# Patient Record
Sex: FEMALE | Race: WHITE
[De-identification: names, ages, dates, MRNs, and addresses within clinical notes are randomized per-mention and may not be internally consistent; named-entity substitution may affect disease eponyms.]

---

## 2020-04-25 ENCOUNTER — HOSPITAL ENCOUNTER (INPATIENT)
Dept: HOSPITAL 97 - 2ND-WC | Age: 20
LOS: 2 days | Discharge: HOME | End: 2020-04-27
Attending: SPECIALIST | Admitting: SPECIALIST
Payer: COMMERCIAL

## 2020-04-25 VITALS — BODY MASS INDEX: 35.5 KG/M2

## 2020-04-25 DIAGNOSIS — Z3A.39: ICD-10-CM

## 2020-04-25 PROCEDURE — 36415 COLL VENOUS BLD VENIPUNCTURE: CPT

## 2020-04-25 PROCEDURE — 87340 HEPATITIS B SURFACE AG IA: CPT

## 2020-04-25 PROCEDURE — 85025 COMPLETE CBC W/AUTO DIFF WBC: CPT

## 2020-04-25 PROCEDURE — 86592 SYPHILIS TEST NON-TREP QUAL: CPT

## 2020-04-25 PROCEDURE — 86901 BLOOD TYPING SEROLOGIC RH(D): CPT

## 2020-04-26 LAB
HCT VFR BLD CALC: 27.9 % (ref 36–45)
LYMPHOCYTES # SPEC AUTO: 2.3 K/UL (ref 0.7–4.9)
PMV BLD: 9 FL (ref 7.6–11.3)
RBC # BLD: 3.48 M/UL (ref 3.86–4.86)

## 2020-04-26 PROCEDURE — 10H073Z INSERTION OF MONITORING ELECTRODE INTO PRODUCTS OF CONCEPTION, VIA NATURAL OR ARTIFICIAL OPENING: ICD-10-PCS

## 2020-04-26 PROCEDURE — 0KQM0ZZ REPAIR PERINEUM MUSCLE, OPEN APPROACH: ICD-10-PCS

## 2020-04-26 PROCEDURE — 3E033VJ INTRODUCTION OF OTHER HORMONE INTO PERIPHERAL VEIN, PERCUTANEOUS APPROACH: ICD-10-PCS

## 2020-04-26 PROCEDURE — 4A1H7CZ MONITORING OF PRODUCTS OF CONCEPTION, CARDIAC RATE, VIA NATURAL OR ARTIFICIAL OPENING: ICD-10-PCS

## 2020-04-26 RX ADMIN — IBUPROFEN PRN MG: 600 TABLET ORAL at 14:00

## 2020-04-26 RX ADMIN — METHYLERGONOVINE MALEATE PRN MG: 0.2 TABLET ORAL at 22:00

## 2020-04-26 RX ADMIN — METHYLERGONOVINE MALEATE PRN MG: 0.2 TABLET ORAL at 14:00

## 2020-04-26 RX ADMIN — METHYLERGONOVINE MALEATE PRN MG: 0.2 TABLET ORAL at 17:57

## 2020-04-26 NOTE — OP
Surgeon:  Momo Blount MD



19-year-old primigravida, at 39 weeks, scheduled for induction tomorrow.  Came in last night with rup
ture of membranes, 4 cm.  During the first stage of labor Stadol 1 mg IV, then epidural anesthesia, w
as then later was supplemented with spinal block with fentanyl.  Second stage of about 30 to 35 minut
es.  Spontaneous vaginal delivery of an 8 pounds, 15 ounce female, Apgars 9 and 9.  Second-degree lac
eration, sustained, repaired with 2-0 chromic.  Schultze delivery of the placenta.  Uterus mildly hyp
otonic.  0.2 mg of Methergine as well as IV drip Pitocin and massage.  Estimated blood loss 550 cc to
 this point.  Uterus has contracted down well.  Patient tolerated all procedures well.  Got at least 
2 doses of Cleocin during her labor as she was beta strep positive.



Final Diagnoses:  Term intrauterine pregnancy, 39 weeks.  Spontaneous rupture of membranes, labor aug
mentation, epidural and spinal block.  Mild uterine hypotonus.  Cleocin prophylaxis for beta strep po
sitive status.





NBC/MODL

DD:  04/26/2020 12:51:29Voice ID:  [id number]

DT:  04/26/2020 20:16:31Report ID:  [id number]

## 2020-04-27 VITALS — DIASTOLIC BLOOD PRESSURE: 71 MMHG | TEMPERATURE: 98.7 F | SYSTOLIC BLOOD PRESSURE: 134 MMHG

## 2020-04-27 LAB — RPR SER QL: (no result)

## 2020-04-27 RX ADMIN — IBUPROFEN PRN MG: 600 TABLET ORAL at 14:30

## 2020-04-27 RX ADMIN — METHYLERGONOVINE MALEATE PRN MG: 0.2 TABLET ORAL at 02:55

## 2020-04-27 NOTE — DS
Hospital Course:  A 19-year-old primigravida, 39 weeks, came in with spontaneous
rupture of membranes, clear fluid, and early labor.  During the first stage of 
labor, received Stadol 1 mg IV, and then epidural anesthesia and finally spinal 
block with fentanyl, all by Anesthesia.  Second stage of approximately 30-35 
minutes, spontaneous vaginal delivery of an 8 pounds, 15 ounces female, Apgars 9
and 9.  Midline second-degree laceration simulating episiotomy repair with 2-0 
chromic.  Schultze delivery of the placenta.  Uterus mildly hypotonic.  0.2 mg 
of Methergine IM as well as IV drip Pitocin.  Estimated blood loss was 500-550 
cc.  Postpartum, afebrile, ambulating, and voiding.  Lochia is normal.  Will be 
dismissed this afternoon, to report back to my office in 6 weeks for followup, 
report any temperature elevation of 100 degrees or greater, severe pain, heavy 
bleeding, or any other type of abnormalities.  No post epidural problems.  
Requests no analgesics on dismissal.  She has had her Tdap immunization.  She 
had Cleocin x2 during her labor for beta strep positive status and allergy to 
penicillin.



Final Diagnoses:  Term intrauterine pregnancy 39 weeks, spontaneous rupture of 
membranes, vaginal delivery, epidural and spinal block anesthesia.  Mild uterine
hypotonicity, strep prophylaxis.





YANETH/ALETHEA

DD:  04/27/2020 06:54:06   Voice ID:  [id number]

DT:  04/27/2020 07:16:23   Report ID:  [id number]

TAISHA

## 2020-04-27 NOTE — PREOPHP
Date of Admission:  04/25/2020



19-year-old primigravida set for induction tomorrow, came in with apparent spontaneous rupture of mem
branes and in early labor, 4 cm on admission.  Patient has progressed to 5 cm.  She has had 1 dose of
 Stadol.  Baby's head has reasonable variability considering the Stadol.  No decelerations.  Scalp el
ectrode placed.  The patient is 5 cm, 80% effaced, -1 station.  She is having back labor.  So I think
 the baby is probably posterior.  Pelvic rocks discussed.  Anesthesia has been notified for her epidu
ral.  She has positive beta strep screen and has received 2 doses of Cleocin thus far.  Anticipate de
livery sometime later today.  Labor talk given.





YANETH/ALETHEA

DD:  04/26/2020 08:04:19Voice ID:  [id number]

## 2020-05-10 ENCOUNTER — HOSPITAL ENCOUNTER (EMERGENCY)
Dept: HOSPITAL 97 - ER | Age: 20
Discharge: HOME | End: 2020-05-10
Payer: COMMERCIAL

## 2020-05-10 VITALS — DIASTOLIC BLOOD PRESSURE: 109 MMHG | OXYGEN SATURATION: 100 % | TEMPERATURE: 98.3 F | SYSTOLIC BLOOD PRESSURE: 129 MMHG

## 2020-05-10 DIAGNOSIS — Z80.0: ICD-10-CM

## 2020-05-10 DIAGNOSIS — R07.9: ICD-10-CM

## 2020-05-10 DIAGNOSIS — R06.02: ICD-10-CM

## 2020-05-10 LAB
ALBUMIN SERPL BCP-MCNC: 3.3 G/DL (ref 3.4–5)
ALP SERPL-CCNC: 172 U/L (ref 45–117)
ALT SERPL W P-5'-P-CCNC: 23 U/L (ref 12–78)
AST SERPL W P-5'-P-CCNC: 33 U/L (ref 15–37)
BUN BLD-MCNC: 17 MG/DL (ref 7–18)
GLUCOSE SERPLBLD-MCNC: 100 MG/DL (ref 74–106)
HCT VFR BLD CALC: 27 % (ref 36–45)
LYMPHOCYTES # SPEC AUTO: 3 K/UL (ref 0.7–4.9)
PMV BLD: 8.1 FL (ref 7.6–11.3)
POTASSIUM SERPL-SCNC: 3.5 MMOL/L (ref 3.5–5.1)
RBC # BLD: 3.38 M/UL (ref 3.86–4.86)
TSH SERPL DL<=0.05 MIU/L-ACNC: 1.82 UIU/ML (ref 0.36–3.74)

## 2020-05-10 PROCEDURE — 36415 COLL VENOUS BLD VENIPUNCTURE: CPT

## 2020-05-10 PROCEDURE — 86900 BLOOD TYPING SEROLOGIC ABO: CPT

## 2020-05-10 PROCEDURE — 86901 BLOOD TYPING SEROLOGIC RH(D): CPT

## 2020-05-10 PROCEDURE — 85025 COMPLETE CBC W/AUTO DIFF WBC: CPT

## 2020-05-10 PROCEDURE — 84443 ASSAY THYROID STIM HORMONE: CPT

## 2020-05-10 PROCEDURE — 99284 EMERGENCY DEPT VISIT MOD MDM: CPT

## 2020-05-10 PROCEDURE — 71275 CT ANGIOGRAPHY CHEST: CPT

## 2020-05-10 PROCEDURE — 80048 BASIC METABOLIC PNL TOTAL CA: CPT

## 2020-05-10 PROCEDURE — 96374 THER/PROPH/DIAG INJ IV PUSH: CPT

## 2020-05-10 PROCEDURE — 86850 RBC ANTIBODY SCREEN: CPT

## 2020-05-10 PROCEDURE — 80076 HEPATIC FUNCTION PANEL: CPT

## 2020-05-10 PROCEDURE — 96361 HYDRATE IV INFUSION ADD-ON: CPT

## 2020-05-10 NOTE — ER
Nurse's Notes                                                                                     

 [facility]                                                                 

Name: Carmen Gonzalez                                                                               

Age: 19 yrs                                                                                       

Sex: Female                                                                                       

: 2000                                                                                   

MRN: [id number]                                                                                   

Arrival Date: 05/10/2020                                                                          

Time: 19:31                                                                                       

Account#: [id number]                                                                            

Bed 15                                                                                            

Private MD:                                                                                       

Diagnosis: Shortness of breath;Chest pain, unspecified;Anemia complicating pregnancy, childbirth  

  and the puerperium-mild                                                                         

                                                                                                  

Presentation:                                                                                     

05/10                                                                                             

19:41 Chief complaint: Patient states: Pain to trunk/rib cage area, notices SOB. Denies       ll1 

      cough/fever. Vaginal delivery 2 weeks ago, no complications since. Coronavirus screen:      

      Proceed with normal triage. Patient denies a cough. Patient reports shortness of breath     

      or difficulty breathing. Patient denies measured and/or subjective temperature greater      

      than 100.4F prior to today's visit. Patient denies travel on a cruise ship or to a          

      country the [facility] currently lists as an affected area. Patient denies contact with known      

      and/or suspected case of COVID-19. Ebola Screen: Patient denies travel to an                

      Ebola-affected area in the 21 days before illness onset. Initial Sepsis Screen: Does        

      the patient meet any 2 criteria? No. Patient's initial sepsis screen is negative. Does      

      the patient have a suspected source of infection? No. Patient's initial sepsis screen       

      is negative. Risk Assessment: Do you want to hurt yourself or someone else? Patient         

      reports no desire to harm self or others. Onset of symptoms was May 10, 2020.               

19:41 Method Of Arrival: Ambulatory                                                           ll1 

19:41 Acuity: IDALIA 3                                                                           ll1 

                                                                                                  

Triage Assessment:                                                                                

20:00 General: Appears in no apparent distress. uncomfortable, Behavior is cooperative,       vc  

      appropriate for age, anxious. Pain: Complains of pain in chest and diaphragm.               

                                                                                                  

OB/GYN:                                                                                           

21:00 LMP N/A - Breastfeeding                                                                 vc  

                                                                                                  

Historical:                                                                                       

- Allergies:                                                                                      

19:44 PENICILLINS;                                                                            ll1 

- PSHx:                                                                                           

19:44 None;                                                                                   ll1 

                                                                                                  

- Immunization history:: Adult Immunizations up to date.                                          

- Social history:: Smoking status: Patient denies any tobacco usage or history of.                

  Patient/guardian denies using alcohol, street drugs, tobacco products.                          

                                                                                                  

                                                                                                  

Screenin:00 Abuse screen: Denies threats or abuse. Nutritional screening: No deficits noted.        vc  

      Tuberculosis screening: No symptoms or risk factors identified. Fall Risk None              

      identified.                                                                                 

                                                                                                  

Assessment:                                                                                       

20:00 General: Appears in no apparent distress. uncomfortable, Behavior is cooperative,       vc  

      appropriate for age, anxious. Pain: Complains of pain in diaphragm and chest. Neuro:        

      Level of Consciousness is awake, alert, obeys commands, Oriented to person, place,          

      time, situation, Appropriate for age. Cardiovascular: Capillary refill < 3 seconds          

      Patient's skin is warm and dry. Respiratory: Airway is patent Respiratory effort is         

      even, unlabored, Respiratory pattern is regular, symmetrical. GI: No signs and/or           

      symptoms were reported involving the gastrointestinal system. : No signs and/or           

      symptoms were reported regarding the genitourinary system. EENT: No deficits noted.         

      Derm: Skin is intact, is healthy with good turgor, Skin temperature is warm.                

      Musculoskeletal: Circulation, motion, and sensation intact. Range of motion: intact in      

      all extremities.                                                                            

21:00 Reassessment: Patient appears in no apparent distress at this time. Patient and/or      vc  

      family updated on plan of care and expected duration. Pain level reassessed. Patient is     

      alert, oriented x 3, equal unlabored respirations, skin warm/dry/pink.                      

21:45 Reassessment: Patient appears in no apparent distress at this time. Patient and/or      vc  

      family updated on plan of care and expected duration. Pain level reassessed. Patient is     

      alert, oriented x 3, equal unlabored respirations, skin warm/dry/pink. Patient states       

      feeling better.                                                                             

                                                                                                  

Vital Signs:                                                                                      

19:41  / 109; Pulse 67; Resp 18; Temp 98.3; Pulse Ox 100% ; Pain 8/10;                  ll1 

21:00  / 87; Pulse 68; Resp 18; Pulse Ox 100% on R/A;                                   vc  

                                                                                                  

ED Course:                                                                                        

19:31 Patient arrived in ED.                                                                  cl3 

19:39 [facility]                                                        snw 

19:39 Anmol Diaz MD is Attending Physician.                                             snw 

19:43 Triage completed.                                                                       ll1 

19:44 Arm band placed on Patient placed in an exam room, on a stretcher.                      ll1 

20:00 Patient has correct armband on for positive identification. Bed in low position. Call   vc  

      light in reach. Pulse ox on. NIBP on.                                                       

20:10 Zuly Carty RN is Primary Nurse.                                                  vc  

20:56 CT Chest For PE Angio In Process Unspecified.                                           EDMS

21:45 No provider procedures requiring assistance completed. IV discontinued, intact,         vc  

      bleeding controlled, No redness/swelling at site. Pressure dressing applied.                

                                                                                                  

Administered Medications:                                                                         

20:20 Drug: NS 0.9% 1000 ml Route: IV; Rate: 75 ml/hr; Site: right antecubital;               vc  

22:00 Follow up: IV Status: Completed infusion; IV Intake: 150ml                              vc  

20:20 Drug: fentaNYL (PF) 25 mcg Route: IVP; Site: right antecubital;                         vc  

21:00 Follow up: Response: No adverse reaction                                                vc  

21:52 Drug: fentaNYL (PF) 25 mcg Route: IVP; Site: right antecubital;                         vc  

22:00 Follow up: Response: No adverse reaction                                                vc  

                                                                                                  

                                                                                                  

Intake:                                                                                           

22:00 IV: 150ml; Total: 150ml.                                                                vc  

                                                                                                  

Outcome:                                                                                          

21:17 Discharge ordered by MD.                                                                snw 

21:50 Discharged to home ambulatory.                                                          vc  

21:50 Condition: good                                                                             

21:50 Discharge instructions given to patient, Instructed on discharge instructions, follow       

      up and referral plans. no drinking with medication, no driving heavy equipment,             

      medication usage, Demonstrated understanding of instructions, follow-up care,               

      medications, Prescriptions given X 2.                                                       

21:52 Patient left the ED.                                                                    vc  

                                                                                                  

Signatures:                                                                                       

Dispatcher [employer]                           EDMS                                                 

Aisha Olivo, CARLYP-C                 FNP-Maria G Seymour                                cl3                                                  

Zuly Carty RN                    Juliette Davidson vc RN                         Eloisa Gonzalez RN                       RN   ll1                                                  

                                                                                                  

**************************************************************************************************

## 2020-05-10 NOTE — RAD REPORT
EXAM DESCRIPTION:  CT - Chest For Pe Angio - 5/10/2020 8:56 pm

 

CLINICAL HISTORY:  Chest pain.

CHEST PAIN

 

COMPARISON:  No comparisons

 

TECHNIQUE:  CT angiogram of the pulmonary arteries was performed with MIP.

 

All CT scans are performed using dose optimization technique as appropriate and may include automated
 exposure control or mA/KV adjustment according to patient size.

 

FINDINGS:  No evidence of pulmonary thromboembolism.

 

No acute aortic finding demonstrated.

 

The lungs are clear.

 

No significant pericardial or pleural fluid.

 

No concerning bony finding.

 

IMPRESSION:  No evidence of pulmonary thromboembolism.

 

No acute lung findings.

## 2020-05-10 NOTE — EDPHYS
Physician Documentation                                                                           

 [facility]                                                                 

Name: Carmen Gonzalez                                                                               

Age: 19 yrs                                                                                       

Sex: Female                                                                                       

: 2000                                                                                   

MRN: [id number]                                                                                   

Arrival Date: 05/10/2020                                                                          

Time: 19:31                                                                                       

Account#: [id number]                                                                            

Bed 15                                                                                            

Private MD:                                                                                       

ED Physician Anmol Diaz                                                                      

HPI:                                                                                              

05/10                                                                                             

19:58 This 19 yrs old  Female presents to ER via Ambulatory with complaints of Pains snw 

      In Ribs.                                                                                    

19:58 Onset: The symptoms/episode began/occurred suddenly, today. Associated signs and        snw 

      symptoms: Pertinent positives: shortness of breath. The patient has experienced a           

      previous episode, during recent pregnancy, pt thought maybe it was just a contraction.      

      uncomplicated vaginal delivery 2 weeks ago.                                                 

                                                                                                  

OB/GYN:                                                                                           

21:00 LMP N/A - Breastfeeding                                                                 vc  

                                                                                                  

Historical:                                                                                       

- Allergies:                                                                                      

19:44 PENICILLINS;                                                                            ll1 

- PSHx:                                                                                           

19:44 None;                                                                                   ll1 

                                                                                                  

- Immunization history:: Adult Immunizations up to date.                                          

- Social history:: Smoking status: Patient denies any tobacco usage or history of.                

  Patient/guardian denies using alcohol, street drugs, tobacco products.                          

                                                                                                  

                                                                                                  

ROS:                                                                                              

19:57 Constitutional: Negative for fever, chills, and weight loss, Eyes: Negative for injury, snw 

      pain, redness, and discharge, ENT: Negative for injury, pain, and discharge, Neck:          

      Negative for injury, pain, and swelling, Respiratory: Negative for shortness of breath,     

      cough, wheezing, and pleuritic chest pain, Abdomen/GI: Negative for abdominal pain,         

      nausea, vomiting, diarrhea, and constipation, Back: Negative for injury and pain, :       

      Negative for injury, bleeding, discharge, and swelling, MS/Extremity: Negative for          

      injury and deformity, Skin: Negative for injury, rash, and discoloration, Neuro:            

      Negative for headache, weakness, numbness, tingling, and seizure.                           

19:57 Cardiovascular: Positive for chest pain, of the diaphragm, Negative for edema,              

      orthopnea, palpitations, paroxysmal nocturnal dyspnea.                                      

19:57 Respiratory: Positive for pleurisy, of the diaphragm, shortness of breath, at rest.         

                                                                                                  

Exam:                                                                                             

19:57 Head/Face:  Normocephalic, atraumatic. Eyes:  Pupils equal round and reactive to light, snw 

      extra-ocular motions intact.  Lids and lashes normal.  Conjunctiva and sclera are           

      non-icteric and not injected.  Cornea within normal limits.  Periorbital areas with no      

      swelling, redness, or edema. ENT:  Nares patent. No nasal discharge, no septal              

      abnormalities noted.  Tympanic membranes are normal and external auditory canals are        

      clear.  Oropharynx with no redness, swelling, or masses, exudates, or evidence of           

      obstruction, uvula midline.  Mucous membranes moist. Neck:  Trachea midline, no             

      thyromegaly or masses palpated, and no cervical lymphadenopathy.  Supple, full range of     

      motion without nuchal rigidity, or vertebral point tenderness.  No Meningismus.             

      Chest/axilla:  Normal chest wall appearance and motion.  Nontender with no deformity.       

      No lesions are appreciated. Cardiovascular:  Regular rate and rhythm with a normal S1       

      and S2.  No gallops, murmurs, or rubs.  Normal PMI, no JVD.  No pulse deficits.             

      Respiratory:  Lungs have equal breath sounds bilaterally, clear to auscultation and         

      percussion.  No rales, rhonchi or wheezes noted.  No increased work of breathing, no        

      retractions or nasal flaring. Abdomen/GI:  Soft, non-tender, with normal bowel sounds.      

      No distension or tympany.  No guarding or rebound.  No evidence of tenderness               

      throughout. Back:  No spinal tenderness.  No costovertebral tenderness.  Full range of      

      motion. MS/ Extremity:  Pulses equal, no cyanosis.  Neurovascular intact.  Full, normal     

      range of motion. Neuro:  Awake and alert, GCS 15, oriented to person, place, time, and      

      situation.  Cranial nerves II-XII grossly intact.  Motor strength 5/5 in all                

      extremities.  Sensory grossly intact.  Cerebellar exam normal.  Normal gait.                

19:57 Constitutional: The patient appears alert, awake, anxious, pale.                            

19:57 Skin: Appearance: normal except for affected area, Color: pale, Temperature: warm,          

      Moisture: normal moisture, petechiae, not noted, ecchymosis, not noted.                     

                                                                                                  

Vital Signs:                                                                                      

19:41  / 109; Pulse 67; Resp 18; Temp 98.3; Pulse Ox 100% ; Pain 8/10;                  ll1 

21:00  / 87; Pulse 68; Resp 18; Pulse Ox 100% on R/A;                                   vc  

                                                                                                  

MDM:                                                                                              

19:47 Patient medically screened.                                                             snw 

21:18 Data reviewed: vital signs, nurses notes. Data interpreted: Pulse oximetry: on room air snw 

      is 100 %. Interpretation: normal. Counseling: I had a detailed discussion with the          

      patient and/or guardian regarding: the historical points, exam findings, and any            

      diagnostic results supporting the discharge/admit diagnosis, lab results, radiology         

      results, the need for outpatient follow up, to return to the emergency department if        

      symptoms worsen or persist or if there are any questions or concerns that arise at          

      home. Special discussion: Based on the patient's history, exam, and Dx evaluation,          

      there is no indication for emergent intervention or inpatient Tx. It is understood by       

      the patient/guardian that if the Sx's persist or worsen they need to return immediately     

      for re-evaluation. Based on the history and exam findings, there is no indication for       

      further emergent testing or inpatient evaluation. I discussed with the patient/guardian     

      the need to see the OB Gyne specialist for further evaluation of the symptoms.              

21:23 Response to treatment: the patient's symptoms have mildly improved after treatment, the snw 

      patient's symptoms have markedly improved after treatment. ED course: pt encouraged to      

      drink plenty of water, positioning with breastfeeding to decrease muscle strain, rest,      

      continue prenatal vitamins..                                                                

                                                                                                  

05/10                                                                                             

19:46 Order name: CBC with Diff; Complete Time: 20:22                                         snw 

05/10                                                                                             

19:46 Order name: Chem 7; Complete Time: 20:44                                                snw 

05/10                                                                                             

19:46 Order name: LFT's; Complete Time: 20:44                                                 snw 

05/10                                                                                             

19:46 Order name: CT Chest For PE Angio; Complete Time: 21:14                                 snw 

05/10                                                                                             

19:46 Order name: TSH; Complete Time: 20:44                                                   snw 

05/10                                                                                             

19:46 Order name: TS; Complete Time: 20:55                                                    snw 

                                                                                                  

Administered Medications:                                                                         

20:20 Drug: NS 0.9% 1000 ml Route: IV; Rate: 75 ml/hr; Site: right antecubital;               vc  

22:00 Follow up: IV Status: Completed infusion; IV Intake: 150ml                              vc  

20:20 Drug: fentaNYL (PF) 25 mcg Route: IVP; Site: right antecubital;                         vc  

21:00 Follow up: Response: No adverse reaction                                                vc  

21:52 Drug: fentaNYL (PF) 25 mcg Route: IVP; Site: right antecubital;                         vc  

22:00 Follow up: Response: No adverse reaction                                                vc  

                                                                                                  

                                                                                                  

Disposition:                                                                                      

                                                                                             

07:08 Co-signature as Attending Physician, Anmol Diaz MD I agree with the assessment and  [facility] 

      plan of care.                                                                               

                                                                                                  

Disposition:                                                                                      

05/10/20 21:17 Discharged to Home. Impression: Shortness of breath, Chest pain, unspecified,      

  Anemia complicating pregnancy, childbirth and the puerperium - mild.                            

- Condition is Stable.                                                                            

- Discharge Instructions: Anemia, Nonspecific, Nonspecific Chest Pain, Shortness of               

  Breath, Rehydration, Adult, Heat Therapy.                                                       

- Prescriptions for Prenatal Vitamin 27- 0.8 mg Oral Tablet - take 1 tablet by ORAL               

  route once daily; 30 tablet. orphenadrine citrate 100 mg Oral Tablet Sustained                  

  Release - take 1 tablet by ORAL route 2 times per day As needed; 20 tablet.                     

- Medication Reconciliation Form, Thank You Letter, Antibiotic Education, Prescription            

  Opioid Use form.                                                                                

- Follow up: Emergency Department; When: As needed; Reason: Worsening of condition.               

  Follow up: Private Physician; When: 1 week; Reason: Recheck today's complaints,                 

  Continuance of care, Re-evaluation by your physician.                                           

                                                                                                  

                                                                                                  

                                                                                                  

Signatures:                                                                                       

Dispatcher [employer]                           EDMS                                                 

Aisha Olivo, MICHELA-C                 FNP-Csnw                                                  

Zuly Carty RN RN vc Lewis, Lynsay, RN                       RN   [facility]                                                  

Anmol Diaz MD MD   [facility]                                                  

                                                                                                  

Corrections: (The following items were deleted from the chart)                                    

05/10                                                                                             

21:52 21:17 05/10/2020 21:17 Discharged to Home. Impression: Shortness of breath; Chest pain, vc  

      unspecified; Anemia complicating pregnancy, childbirth and the puerperium - mild.           

      Condition is Stable. Forms are Medication Reconciliation Form, Thank You Letter,            

      Antibiotic Education, Prescription Opioid Use. Follow up: Emergency Department; When:       

      As needed; Reason: Worsening of condition. Follow up: Private Physician; When: 1 week;      

      Reason: Recheck today's complaints, Continuance of care, Re-evaluation by your              

      physician. snw                                                                              

                                                                                                  

**************************************************************************************************

## 2021-03-19 ENCOUNTER — HOSPITAL ENCOUNTER (EMERGENCY)
Dept: HOSPITAL 97 - ER | Age: 21
Discharge: ANOTHER HOSPITAL | End: 2021-03-19
Payer: COMMERCIAL

## 2021-03-19 VITALS — TEMPERATURE: 97.1 F

## 2021-03-19 VITALS — DIASTOLIC BLOOD PRESSURE: 78 MMHG | OXYGEN SATURATION: 99 % | SYSTOLIC BLOOD PRESSURE: 136 MMHG

## 2021-03-19 DIAGNOSIS — Z88.0: ICD-10-CM

## 2021-03-19 DIAGNOSIS — Z20.822: ICD-10-CM

## 2021-03-19 DIAGNOSIS — K85.10: Primary | ICD-10-CM

## 2021-03-19 DIAGNOSIS — R94.5: ICD-10-CM

## 2021-03-19 LAB
ALBUMIN SERPL BCP-MCNC: 4.1 G/DL (ref 3.4–5)
ALP SERPL-CCNC: (no result) U/L (ref 45–117)
ALT SERPL W P-5'-P-CCNC: 957 U/L (ref 12–78)
AST SERPL W P-5'-P-CCNC: 374 U/L (ref 15–37)
BLD SMEAR INTERP: (no result)
BUN BLD-MCNC: 11 MG/DL (ref 7–18)
GLUCOSE SERPLBLD-MCNC: 85 MG/DL (ref 74–106)
HCT VFR BLD CALC: 39.8 % (ref 36–45)
LIPASE SERPL-CCNC: (no result) U/L (ref 73–393)
LYMPHOCYTES # SPEC AUTO: 0.9 K/UL (ref 0.7–4.9)
MORPHOLOGY BLD-IMP: (no result)
PMV BLD: 9.2 FL (ref 7.6–11.3)
POTASSIUM SERPL-SCNC: 3.6 MMOL/L (ref 3.5–5.1)
RBC # BLD: 4.83 M/UL (ref 3.86–4.86)

## 2021-03-19 PROCEDURE — 80074 ACUTE HEPATITIS PANEL: CPT

## 2021-03-19 PROCEDURE — 74177 CT ABD & PELVIS W/CONTRAST: CPT

## 2021-03-19 PROCEDURE — 80048 BASIC METABOLIC PNL TOTAL CA: CPT

## 2021-03-19 PROCEDURE — 96375 TX/PRO/DX INJ NEW DRUG ADDON: CPT

## 2021-03-19 PROCEDURE — 85025 COMPLETE CBC W/AUTO DIFF WBC: CPT

## 2021-03-19 PROCEDURE — 36415 COLL VENOUS BLD VENIPUNCTURE: CPT

## 2021-03-19 PROCEDURE — 83690 ASSAY OF LIPASE: CPT

## 2021-03-19 PROCEDURE — 87086 URINE CULTURE/COLONY COUNT: CPT

## 2021-03-19 PROCEDURE — 96367 TX/PROPH/DG ADDL SEQ IV INF: CPT

## 2021-03-19 PROCEDURE — 81015 MICROSCOPIC EXAM OF URINE: CPT

## 2021-03-19 PROCEDURE — 87088 URINE BACTERIA CULTURE: CPT

## 2021-03-19 PROCEDURE — 80076 HEPATIC FUNCTION PANEL: CPT

## 2021-03-19 PROCEDURE — 96365 THER/PROPH/DIAG IV INF INIT: CPT

## 2021-03-19 PROCEDURE — 96361 HYDRATE IV INFUSION ADD-ON: CPT

## 2021-03-19 PROCEDURE — 80320 DRUG SCREEN QUANTALCOHOLS: CPT

## 2021-03-19 PROCEDURE — 76705 ECHO EXAM OF ABDOMEN: CPT

## 2021-03-19 PROCEDURE — 99285 EMERGENCY DEPT VISIT HI MDM: CPT

## 2021-03-19 NOTE — RAD REPORT
EXAM DESCRIPTION:  CTAbdomen   Pelvis W Contrast - 3/19/2021 1:07 pm

 

CLINICAL HISTORY:  Abdominal pain.

ABD PAIN

 

COMPARISON:  No comparisons

 

TECHNIQUE:  Biphasic CT imaging of the abdomen and pelvis was performed with 100 ml non-ionic IV cont
rast.

 

All CT scans are performed using dose optimization technique as appropriate and may include automated
 exposure control or mA/KV adjustment according to patient size.

 

FINDINGS:  The lung bases are clear.

 

The liver, spleen, adrenal glands and kidneys are within normal limits.

 

Cholelithiasis is suspected. There is moderate edema and inflammation about the pancreas compatible w
ith acute pancreatitis. No pseudocyst, portal venous thrombus or evidence of pancreatic necrosis.

 

No bowel obstruction, free air, free fluid or abscess.  The appendix is normal.  No evidence of signi
ficant lymphadenopathy.

 

No suspicious bony findings.

 

IMPRESSION:  Moderate acute pancreatitis is suspected. Correlation with amylase lipase levels is hiram thomas.

## 2021-03-19 NOTE — RAD REPORT
EXAM DESCRIPTION:  US - Abdomen Exam Limited - 3/19/2021 12:03 pm

 

CLINICAL HISTORY:  Abdominal pain.

 

COMPARISON:  None.

 

FINDINGS:   The gallbladder wall is not thickened. A gallstone is not seen. A fold is present within 
the gallbladder

 

The biliary tree is normal caliber.

 

IMPRESSION:  Unremarkable gallbladder ultrasound.

## 2021-03-19 NOTE — EDPHYS
Physician Documentation                                                                           

 [facility]                                                                 

Name: Carmen Gonzalez                                                                               

Age: 20 yrs                                                                                       

Sex: Female                                                                                       

: 2000                                                                                   

MRN: [id number]                                                                                   

Arrival Date: 2021                                                                          

Time: 10:22                                                                                       

Account#: [id number]                                                                            

Bed 14                                                                                            

Private MD:                                                                                       

ED Physician Nathan Santacruz                                                                       

HPI:                                                                                              

                                                                                             

11:34 This 20 yrs old  Female presents to ER via Ambulatory with complaints of       jr8 

      Epigastric Pain.                                                                            

11:34 The patient presents with abdominal pain in the epigastric area, in the right upper     jr8 

      quadrant, that is diffuse. The symptoms radiate to back. Associated signs and symptoms:     

      Pertinent positives: nausea and vomiting. The symptoms are described as vague. Patient      

      reports Nausea/Vomiting since Wednesday. She said she has reproducible epigastric and       

      it is worse when she breaths in and pain goes to back. She still has appendix and           

      denies any surgical history. She had a baby 11 months ago and denies any sudden weight      

      loss. No recent travel and allergy to PCN.                                                  

                                                                                                  

OB/GYN:                                                                                           

10:58 LMP 3/15/2021                                                                           ca1 

                                                                                                  

Historical:                                                                                       

- Allergies:                                                                                      

10:58 PENICILLINS;                                                                            ca1 

- Home Meds:                                                                                      

10:58 None [Active];                                                                          ca1 

- PMHx:                                                                                           

10:58 None;                                                                                   ca1 

- PSHx:                                                                                           

10:58 None;                                                                                   ca1 

                                                                                                  

- Immunization history:: Flu vaccine is not up to date.                                           

- Social history:: Smoking status: Patient denies any tobacco usage or history of.                

                                                                                                  

                                                                                                  

ROS:                                                                                              

11:38 Cardiovascular: Negative for chest pain, palpitations, and edema, Respiratory: Negative jr8 

      for shortness of breath, cough, wheezing, and pleuritic chest pain, Back: Negative for      

      injury and pain, MS/Extremity: Negative for injury and deformity, Neuro: Negative for       

      headache, weakness, numbness, tingling, and seizure.                                        

11:38 Constitutional: Positive for malaise, poor PO intake.                                       

11:38 Abdomen/GI: Positive for abdominal pain, nausea and vomiting.                               

11:38 Skin: Positive for pallor.                                                                  

                                                                                                  

Exam:                                                                                             

11:39 Chest/axilla:  Normal chest wall appearance and motion.  Nontender with no deformity.   jr8 

      No lesions are appreciated. Cardiovascular:  Regular rate and rhythm with a normal S1       

      and S2.  No gallops, murmurs, or rubs.  Normal PMI, no JVD.  No pulse deficits.             

      Respiratory:  Lungs have equal breath sounds bilaterally, clear to auscultation and         

      percussion.  No rales, rhonchi or wheezes noted.  No increased work of breathing, no        

      retractions or nasal flaring. MS/ Extremity:  Pulses equal, no cyanosis.  Neurovascular     

      intact.  Full, normal range of motion. Neuro:  Awake and alert, GCS 15, oriented to         

      person, place, time, and situation.  Cranial nerves II-XII grossly intact.  Motor           

      strength 5/5 in all extremities.  Sensory grossly intact.  Cerebellar exam normal.          

      Normal gait.                                                                                

11:39 Constitutional: The patient appears alert, lethargic, obviously ill, in obvious pain,       

      pale.                                                                                       

11:39 Abdomen/GI: Inspection: abdomen appears normal, Bowel sounds: normal, in all quadrants,     

      Palpation: soft, moderate abdominal tenderness, Indicators: McBurney's point is not         

      tender, Gilbert's sign is positive.                                                          

11:39 Skin: Appearance: Color: pale, Temperature: cool.                                           

                                                                                                  

Vital Signs:                                                                                      

10:56  / 72; Pulse 66; Resp 16 S; Temp 97.1(TE); Pulse Ox 99% on R/A; Weight 82.55 kg   ca1 

      (R); Height 5 ft. 6 in. (167.64 cm) (R); Pain 10/10;                                        

14:09  / 77; Pulse 62; Resp 17 S; Pulse Ox 99% on R/A;                                  jd3 

15:18  / 80; Pulse 67; Resp 17 S; Pulse Ox 98% on R/A;                                  jd3 

16:13  / 78; Pulse 71; Resp 16 S; Pulse Ox 99% on R/A;                                  jd3 

10:56 Body Mass Index 29.38 (82.55 kg, 167.64 cm)                                             ca1 

                                                                                                  

MDM:                                                                                              

11:19 Patient medically screened.                                                             jr8 

11:41 Data reviewed: vital signs, nurses notes, lab test result(s). Data interpreted: Cardiac jr8 

      monitor: rate is 66 beats/min, Pulse oximetry: on room air is 99 %. Interpretation:         

      normal.                                                                                     

14:43 Counseling: I had a detailed discussion with the patient and/or guardian regarding: the jr8 

      historical points, exam findings, and any diagnostic results supporting the                 

      discharge/admit diagnosis, lab results, radiology results, the need to transfer to          

      another facility, [facility] does not immediately have the required       

      specialist. ED course: [facility]. [facility] accepted patient for further evaluation .         

                                                                                                  

                                                                                             

11:34 Order name: Basic Metabolic Panel                                                       [facility] 

                                                                                             

11:34 Order name: CBC with Diff                                                               [facility] 

                                                                                             

11:34 Order name: Hepatic Function                                                            [facility] 

                                                                                             

11:34 Order name: Lipase                                                                      [facility] 

                                                                                             

11:34 Order name: Urine Microscopic Only; Complete Time: 14:20                                [facility] 

                                                                                             

11:34 Order name: Basic Metabolic Panel; Complete Time: 13:44                                 [facility]

                                                                                             

11:34 Order name: CBC with Automated Diff; Complete Time: 12:46                               [facility]

                                                                                             

11:34 Order name: Liver (Hepatic) Function; Complete Time: 13:44                              [facility]

                                                                                             

11:34 Order name: Lipase; Complete Time: 13:44                                                [facility]

                                                                                             

12:10 Order name: CBC Smear Scan; Complete Time: 12:46                                        [facility]

                                                                                             

13:46 Order name: ETOH Level; Complete Time: 15:30                                            [facility] 

                                                                                             

14:20 Order name: Urine Culture                                                               [facility]

                                                                                             

14:36 Order name: Hepatitis Panel                                                             [facility] 

                                                                                             

11:34 Order name: IV Saline Lock; Complete Time: 11:47                                        [facility] 

                                                                                             

11:34 Order name: Labs collected and sent; Complete Time: 11:47                               [facility] 

                                                                                             

11:34 Order name: Urine Pregnancy Test (obtain specimen); Complete Time: 12:51                [facility] 

                                                                                             

11:44 Order name: US Abdomen Limited; Complete Time: 12:16                                    [facility] 

                                                                                             

12:18 Order name: CT Abd/Pelvis - IV Contrast Only; Complete Time: 13:44                      [facility] 

                                                                                             

11:34 Order name: Urine Dipstick-Ancillary (obtain specimen); Complete Time: 12:51            [facility] 

                                                                                                  

Administered Medications:                                                                         

11:54 Drug: NS 0.9% 1000 ml Route: IV; Rate: 1000 ml; Site: right antecubital;                jd3 

12:50 Follow up: Response: No adverse reaction; IV Status: Completed infusion; IV Intake:     jd3 

      1000ml                                                                                      

11:54 Drug: Zofran (Ondansetron) 4 mg Route: IVP; Site: right antecubital;                    jd3 

12:30 Follow up: Response: No adverse reaction                                                jd3 

11:54 Drug: morphine 4 mg Route: IVP; Site: right antecubital;                                jd3 

12:30 Follow up: Response: No adverse reaction; RASS: Alert and Calm (0)                      jd3 

12:34 Drug: morphine 4 mg Route: IVP; Site: right antecubital;                                jd3 

13:30 Follow up: Response: No adverse reaction; RASS: Alert and Calm (0)                      jd3 

13:59 Drug: Flagyl 500 mg Volume: 100 ml; Route: IVPB; Rate: 200 ml/hr; Infused Over: 30      jd3 

      mins; Site: right antecubital;                                                              

14:50 Follow up: Response: No adverse reaction; IV Status: Completed infusion                 jd3 

14:28 Drug: Dilaudid 0.5 mg Route: IVP; Site: right antecubital;                              jd3 

15:20 Follow up: Response: No adverse reaction; RASS: Alert and Calm (0)                      jd3 

14:58 Drug: Ciprofloxacin 400 mg Volume: 200 ml; Route: IVPB; Infused Over: 60 mins; Site:    [facility] 

      right antecubital;                                                                          

15:35 Follow up: Response: No adverse reaction; IV Status: Infusion continued upon transfer   jd3 

14:58 Drug: NS 0.9% 1000 ml Route: IV; Rate: 100 ml/hr; Site: right antecubital;              jd3 

15:34 Follow up: Response: No adverse reaction; IV Status: Infusion continued upon transfer   jd3 

16:01 Drug: Zofran (Ondansetron) 4 mg Route: IVP; Site: right antecubital;                    jd3 

16:15 Follow up: Response: No adverse reaction                                                jd3 

16:02 CANCELLED (Duplicate Order): Zofran (Ondansetron) 4 mg IVP once; over 2 minutes         jd3 

16:07 Drug: Dilaudid 0.5 mg Route: IVP; Site: right antecubital;                              jd3 

16:15 Follow up: Response: RASS: Alert and Calm (0); RASS: Alert and Calm (0), medicated at   [facility] 

      transfer                                                                                    

                                                                                                  

                                                                                                  

Disposition:                                                                                      

19:24 Co-signature as Attending Physician, Nathan Santacruz MD I agree with the assessment and   kdr 

      plan of care.                                                                               

                                                                                                  

Disposition:                                                                                      

21 14:45 Transfer ordered to [facility]. Diagnosis are           

  Biliary acute pancreatitis, Abnormal results of liver function studies.                         

- Reason for transfer: Higher level of care.                                                      

- Accepting physician is Dr. June.                                                               

- Condition is Fair.                                                                              

- Problem is new.                                                                                 

- Symptoms have improved.                                                                         

                                                                                                  

                                                                                                  

                                                                                                  

Signatures:                                                                                       

Dispatcher [employer]                           EDMS                                                 

Nathan Santacruz MD MD kdr Roszak, Josh, PA PA   jr8                                                  

Sudhir Alcantar RN                    RN   jd3                                                  

Alice Shafer RN                        RN   ca1                                                  

                                                                                                  

Corrections: (The following items were deleted from the chart)                                    

16:02 15:56 Zofran (Ondansetron) 4 mg IVP once; over 2 minutes ordered. jr8                   jd3 

16:13 14:37 CORONAVIRUS+MR.LAB.BRZ ordered. EDMS                                              EDMS

16:20 14:45 2021 14:45 Transfer ordered to [facility].       jd3 

      Diagnosis is Biliary acute pancreatitis; Abnormal results of liver function studies.        

      Reason for transfer: Higher level of care. Accepting physician is Dr. June. Condition      

      is Fair. Problem is new. Symptoms have improved. jr8                                        

                                                                                                  

**************************************************************************************************

## 2023-04-25 ENCOUNTER — HOSPITAL ENCOUNTER (EMERGENCY)
Dept: HOSPITAL 97 - ER | Age: 23
Discharge: HOME | End: 2023-04-25
Payer: COMMERCIAL

## 2023-04-25 VITALS — DIASTOLIC BLOOD PRESSURE: 57 MMHG | SYSTOLIC BLOOD PRESSURE: 110 MMHG

## 2023-04-25 VITALS — TEMPERATURE: 98.5 F

## 2023-04-25 VITALS — OXYGEN SATURATION: 100 %

## 2023-04-25 DIAGNOSIS — O46.92: Primary | ICD-10-CM

## 2023-04-25 DIAGNOSIS — Z3A.15: ICD-10-CM

## 2023-04-25 DIAGNOSIS — Z88.0: ICD-10-CM

## 2023-04-25 LAB
ALBUMIN SERPL BCP-MCNC: 3.2 G/DL (ref 3.4–5)
ALP SERPL-CCNC: 42 U/L (ref 45–117)
ALT SERPL W P-5'-P-CCNC: 18 U/L (ref 13–56)
AST SERPL W P-5'-P-CCNC: 12 U/L (ref 15–37)
BUN BLD-MCNC: 6 MG/DL (ref 7–18)
GLUCOSE SERPLBLD-MCNC: 90 MG/DL (ref 74–106)
HCT VFR BLD CALC: 32.2 % (ref 36–45)
LYMPHOCYTES # SPEC AUTO: 1.6 K/UL (ref 0.7–4.9)
MCV RBC: 82.1 FL (ref 80–100)
PMV BLD: 8.2 FL (ref 7.6–11.3)
POTASSIUM SERPL-SCNC: 3.8 MEQ/L (ref 3.5–5.1)
RBC # BLD: 3.92 M/UL (ref 3.86–4.86)

## 2023-04-25 PROCEDURE — 81001 URINALYSIS AUTO W/SCOPE: CPT

## 2023-04-25 PROCEDURE — 85025 COMPLETE CBC W/AUTO DIFF WBC: CPT

## 2023-04-25 PROCEDURE — 76815 OB US LIMITED FETUS(S): CPT

## 2023-04-25 PROCEDURE — 80053 COMPREHEN METABOLIC PANEL: CPT

## 2023-04-25 PROCEDURE — 36415 COLL VENOUS BLD VENIPUNCTURE: CPT

## 2023-04-25 PROCEDURE — 99284 EMERGENCY DEPT VISIT MOD MDM: CPT

## 2023-04-25 NOTE — ER
Nurse's Notes                                                                                     

 [facility]                                                                 

Name: Carmen Gonzalez                                                                               

Age: 22 yrs                                                                                       

Sex: Female                                                                                       

: 2000                                                                                   

MRN: [id number]                                                                                   

Arrival Date: 2023                                                                          

Time: 13:48                                                                                       

Account#: [id number]                                                                            

Bed 4                                                                                             

Private MD:                                                                                       

Diagnosis: Other abdominal pain;Hematuria, unspecified                                            

                                                                                                  

Presentation:                                                                                     

                                                                                             

14:39 Chief complaint: Patient states: she is having right sided pain that started at approx  ap3 

      1230 this afternoon. patient also complains of nausea and vomiting brown bile. patient      

      also reports recent constipation. Coronavirus screen: At this time, the client does not     

      indicate any symptoms associated with coronavirus-19. Ebola Screen: No symptoms or          

      risks identified at this time. Initial Sepsis Screen: Does the patient meet any 2           

      criteria? No. Patient's initial sepsis screen is negative. Does the patient have a          

      suspected source of infection? No. Patient's initial sepsis screen is negative. Risk        

      Assessment: Do you want to hurt yourself or someone else? Patient reports no desire to      

      harm self or others. Onset of symptoms was 2023 at 12:30.                         

14:39 Method Of Arrival: Ambulatory                                                           ap3 

14:39 Acuity: IADLIA 3                                                                           ap3 

                                                                                                  

Triage Assessment:                                                                                

14:41 General: Appears in no apparent distress. Behavior is calm, cooperative, appropriate    ap3 

      for age. Pain: Complains of pain in right upper quadrant and right lower quadrant Pain      

      currently is 5 out of 10 on a pain scale. at worst was 9 out of 10 on a pain scale.         

      Pain began 3 hours ago. Neuro: Level of Consciousness is awake, alert, obeys commands,      

      Oriented to person, place, time, situation. Cardiovascular: Patient's skin is warm and      

      dry. Respiratory: Airway is patent Respiratory effort is even, unlabored, Respiratory       

      pattern is regular, symmetrical. GI: Reports lower abdominal pain, upper abdominal          

      pain, nausea, vomiting.                                                                     

                                                                                                  

OB/GYN:                                                                                           

14:42 LMP 1/10/2023                                                                           ap3 

                                                                                                  

Historical:                                                                                       

- Allergies:                                                                                      

14:41 PENICILLINS;                                                                            ap3 

- Home Meds:                                                                                      

14:41 None [Active];                                                                          ap3 

- PMHx:                                                                                           

14:41 None;                                                                                   ap3 

                                                                                                  

- Immunization history:: Client reports receiving the 2nd dose of the Covid vaccine.              

- Social history:: Smoking status: Patient denies any tobacco usage or history of.                

                                                                                                  

                                                                                                  

Screenin:42 [facility] ED Fall Risk Assessment (Adult) History of falling in the last 3 months,       ap3 

      including since admission No falls in past 3 months (0 pts). Abuse screen: Denies           

      threats or abuse. Nutritional screening: No deficits noted. Tuberculosis screening: No      

      symptoms or risk factors identified.                                                        

                                                                                                  

Assessment:                                                                                       

17:16 Reassessment: Patient is alert, oriented x 3, equal unlabored respirations, skin        cm9 

      warm/dry/pink. General: Appears in no apparent distress. Behavior is calm, cooperative,     

      quiet. Pain: Complains of pain in posterior aspect of right lateral abdomen, anterior       

      aspect of right lateral abdomen, right lower quadrant and abdomen diffusely Pain does       

      not radiate. Pain currently is 6 out of 10 on a pain scale. level that patient reports      

      is acceptable is 6 out of 10 on a pain scale. Pain began 1 day ago. Is intermittent,        

      Alleviated by rest. Neuro:. Cardiovascular: GI: Abdomen is round. : Reports cramping.     

      Derm:. Musculoskeletal:.                                                                    

18:37 Reassessment: Patient appears in no apparent distress at this time. No changes from     ld1 

      previously documented assessment. Patient and/or family updated on plan of care and         

      expected duration. Pain level reassessed. Patient is alert, oriented x 3, equal             

      unlabored respirations, skin warm/dry/pink.                                                 

                                                                                                  

Vital Signs:                                                                                      

14:39  / 70; Pulse 73; Resp 17; Temp 98.5; Pulse Ox 98% ; Weight 90.72 kg; Pain 5/10;   ap3 

17:16  / 62; Pulse 71; Resp 16; Pulse Ox 100% on R/A; Pain 6/10;                        cm9 

18:08  / 57; Pulse 76; Resp 18; Pulse Ox 100% on R/A;                                   ld1 

14:39 Pain Scale: Adult                                                                       ap3 

17:16 Pain Scale: Adult                                                                       cm9 

                                                                                                  

ED Course:                                                                                        

13:50 Patient arrived in ED.                                                                  mr  

14:11 Aryan Spears DO is Attending Physician.                                                ms3 

14:41 Triage completed.                                                                       ap3 

14:42 Arm band placed on left wrist.                                                          ap3 

15:27 OB Limited US In Process Unspecified.                                                   EDMS

17:16 Patient has correct armband on for positive identification. Placed in gown. Bed in low  cm9 

      position. Call light in reach. Side rails up X 1.                                           

17:16 Door closed. Lights dimmed. Warm blanket given.                                         cm9 

17:16 CBC with Diff Sent.                                                                     cm9 

17:16 CMP Sent.                                                                               cm9 

17:16 Urinalysis w/ reflexes Sent.                                                            cm9 

17:16 Inserted saline lock: 20 gauge in right antecubital area, using aseptic technique.      cm9 

      Blood collected.                                                                            

18:37 Mirella Spears, RN is Primary Nurse.                                                      ld1 

18:37 No provider procedures requiring assistance completed. IV discontinued, intact,         ld1 

      bleeding controlled, No redness/swelling at site.                                           

                                                                                                  

Administered Medications:                                                                         

No medications were administered                                                                  

                                                                                                  

                                                                                                  

Medication:                                                                                       

18:37 VIS not applicable for this client.                                                     ld1 

                                                                                                  

Outcome:                                                                                          

18:20 Discharge ordered by MD.                                                                ms3 

18:37 Discharged to home ambulatory, with family.                                             ld1 

18:37 Condition: stable                                                                           

18:37 Discharge instructions given to patient, family, Instructed on discharge instructions,      

      follow up and referral plans. Demonstrated understanding of instructions, follow-up         

      care.                                                                                       

18:38 Patient left the ED.                                                                    ld1 

                                                                                                  

Signatures:                                                                                       

Dispatcher [employer]                           EDMS                                                 

ChungCharissefilipeAlicia RN                    RN   ap3                                                  

Aryan Spears DO                        DO   ms3                                                  

Mirella Spears, RN                        RN   ld1                                                  

Simi Brown, YOJANA                  RN   cm9                                                  

                                                                                                  

**************************************************************************************************

## 2023-04-25 NOTE — EDPHYS
Physician Documentation                                                                           

 [facility]                                                                 

Name: Carmen Gonzalez                                                                               

Age: 22 yrs                                                                                       

Sex: Female                                                                                       

: 2000                                                                                   

MRN: [id number]                                                                                   

Arrival Date: 2023                                                                          

Time: 13:48                                                                                       

Account#: [id number]                                                                            

Bed 4                                                                                             

Private MD:                                                                                       

ED Physician Aryan Spears                                                                         

HPI:                                                                                              

                                                                                             

18:20 This 22 yrs old Female presents to ER via Ambulatory with complaints of 15 wks          ms3 

      pregnant, Flank Pain.                                                                       

18:20 22-year-old female with no past medical history presents for right-sided abdominal pain ms3 

      that began 2 hours prior to arrival. Patient states pain is improved since beginning.       

      Patient states the pain initially was a 9/10 and is now 5/10 and described as being         

      sharp. Patient states her last menstrual period was 2023. Patient states        

      she is a -0-0-1.                                                                       

                                                                                                  

OB/GYN:                                                                                           

14:42 LMP 1/10/2023                                                                           ap3 

                                                                                                  

Historical:                                                                                       

- Allergies:                                                                                      

14:41 PENICILLINS;                                                                            ap3 

- Home Meds:                                                                                      

14:41 None [Active];                                                                          ap3 

- PMHx:                                                                                           

14:41 None;                                                                                   ap3 

                                                                                                  

- Immunization history:: Client reports receiving the 2nd dose of the Covid vaccine.              

- Social history:: Smoking status: Patient denies any tobacco usage or history of.                

                                                                                                  

                                                                                                  

ROS:                                                                                              

18:20 Constitutional: Negative for fever, and chills. Neck: Negative for injury, pain, and    ms3 

      swelling, Cardiovascular: Negative for chest pain, and palpitations. Respiratory:           

      Negative for shortness of breath, cough, wheezing, and pleuritic chest pain.                

18:20 MS/Extremity: Negative for injury and deformity, Skin: Negative for injury, rash, and       

      discoloration.                                                                              

18:20 Abdomen/GI: Positive for abdominal pain.                                                    

18:20 All other systems are negative.                                                             

                                                                                                  

Exam:                                                                                             

18:20 Constitutional:  This is a well developed, well nourished patient who is awake, alert,  ms3 

      and in no acute distress. Head/Face:  Normocephalic, atraumatic. Neck:  Trachea             

      midline, no cervical lymphadenopathy.  Supple, full range of motion without nuchal          

      rigidity, or vertebral point tenderness.  No Meningismus. Chest/axilla:  Normal chest       

      wall appearance and motion.  Nontender with no deformity.   Cardiovascular:  Regular        

      rate and rhythm with a normal S1 and S2.  No gallops, murmurs, or rubs.  Normal PMI, no     

      JVD.  No pulse deficits. Respiratory:  Lungs have equal breath sounds bilaterally,          

      clear to auscultation and percussion.  No rales, rhonchi or wheezes noted.  No              

      increased work of breathing, no retractions or nasal flaring. Abdomen/GI:  Soft,            

      non-tender, with normal bowel sounds.  No distension or tympany.  No guarding or            

      rebound.  No evidence of tenderness throughout.                                             

                                                                                                  

Vital Signs:                                                                                      

14:39  / 70; Pulse 73; Resp 17; Temp 98.5; Pulse Ox 98% ; Weight 90.72 kg; Pain 5/10;   ap3 

17:16  / 62; Pulse 71; Resp 16; Pulse Ox 100% on R/A; Pain 6/10;                        cm9 

18:08  / 57; Pulse 76; Resp 18; Pulse Ox 100% on R/A;                                   ld1 

14:39 Pain Scale: Adult                                                                       ap3 

17:16 Pain Scale: Adult                                                                       cm9 

                                                                                                  

MDM:                                                                                              

14:33 Patient medically screened.                                                             ms3 

18:20 Differential diagnosis: UTI, Placental abruption vs round ligament pain. Data reviewed: ms3 

      vital signs, nurses notes, lab test result(s), radiologic studies, and as a result, I       

      will discharge patient. Counseling: I had a detailed discussion with the patient and/or     

      guardian regarding: the historical points, exam findings, and any diagnostic results        

      supporting the discharge/admit diagnosis, lab results, radiology results, the need for      

      outpatient follow up. ED course: Discussed labs and ultrasound results with patient.        

      Patient understands and agrees with plan. All questions were answered. Return               

      precautions discussed include worsening symptoms, or any other concerns. On                 

      reevaluation patient symptoms improved, patient is alert and oriented x4, no apparent       

      distress, nontoxic-appearing, ambulatory in emergency department.                           

                                                                                                  

                                                                                             

14:34 Order name: CBC with Diff; Complete Time: 17:55                                         ms3 

                                                                                             

14:34 Order name: CMP; Complete Time: 17:43                                                   ms3 

                                                                                             

14:34 Order name: Urinalysis w/ reflexes; Complete Time: 17:43                                ms3 

                                                                                             

14:34 Order name: OB Limited US; Complete Time: 15:59                                         ms3 

                                                                                             

14:34 Order name: IV Saline Lock; Complete Time: 17:16                                        ms3 

                                                                                             

14:34 Order name: Labs collected and sent; Complete Time: 17:16                               ms3 

                                                                                                  

Administered Medications:                                                                         

No medications were administered                                                                  

                                                                                                  

                                                                                                  

Disposition Summary:                                                                              

23 18:20                                                                                    

Discharge Ordered                                                                                 

      Location: Home                                                                          ms3 

      Condition: Stable                                                                       ms3 

      Diagnosis                                                                                   

        - Other abdominal pain                                                                ms3 

        - Hematuria, unspecified                                                              ms3 

      Discharge Instructions:                                                                     

        - Discharge Summary Sheet                                                             ms3 

        - Abdominal Pain During Pregnancy                                                     ms3 

        - Hematuria, Adult                                                                    ms3 

      Forms:                                                                                      

        - Medication Reconciliation Form                                                      ms3 

        - Thank You Letter                                                                    ms3 

        - Antibiotic Education                                                                ms3 

        - Prescription Opioid Use                                                             ms3 

Signatures:                                                                                       

Dispatcher [employer]                           Alicia Fernandez RN                    RN   ap3                                                  

Aryan Spears DO                        DO   ms3                                                  

                                                                                                  

**************************************************************************************************

## 2023-04-25 NOTE — XMS REPORT
Continuity of Care Document

                            Created on:2023



Patient:HECTOR MELTON

Sex:Female

:2000

External Reference #:[id number]





Demographics







                          Address                   [address] [place] [address]

 

                          Home Phone                [phone]

 

                          Mobile Phone              [phone]

 

                          Email Address             [email or web address]

 

                          Preferred Language        English

 

                          Marital Status            Unknown

 

                          Yarsanism Affiliation     Unknown

 

                          Race                      Unknown

 

                          Additional Race(s)        Unavailable



                                                    White

 

                          Ethnic Group              Unknown









Author







                          Organization              [facility]

t

 

                          Address                   [address]



                                                    [place] [address]

 

                          Phone                     [phone]









Support







                Name            Relationship    Address         Phone

 

                ROBERTO GARRISON               Unavailable     [phone]

 

                MADHU MELTON               Unavailable     [phone]

[phone]

 

                MADHU MELTON               Unavailable     [phone]

[phone]

 

                Anjana LinhMadhu Mother          Unavailable     [phone]

[phone]









Care Team Providers







                    Name                Role                Phone

 

                    Mattie Fournier CNM Primary Care Physician [phone]

[phone]

 

                    SKINNY BYNUM  Attending Clinician Unavailable

 

                    MATTIE FOURNIER Attending Clinician Unavailable

 

                    Mattie Fournier CNM Attending Clinician [phone]

 

                    Doctor Unassigned, No Name Attending Clinician Unavailable

 

                    SKINNY BYNUM  Admitting Clinician Unavailable

 

                    JOSE MICHAEL  Admitting Clinician Unavailable









Payers







           Payer Name Policy Type Policy Number Effective Date Expiration Date [facility]            [id number]  2019            



           CHOICE EXCHANGE                       00:00:00              

 

           [facility]            [id number]  [id number]            



           CHOICE [place]                       00:00:00              







Problems







       Condition Condition Condition Status Onset  Resolution Last   Treating Co

mments 

Source



       Name   Details Category        Date   Date   Treatment Clinician        



                                                 Date                 

 

       Rubella Rubella Disease Active                              [facility]



       non-immune non-immune                                              it

y of



       status, status,               00:00:                             [place]



       antepartum antepartum               00                                 Me

dical



                                                                      Branch

 

       Maternal Maternal Disease Active                              Unive

rs



       varicella, varicella,               06                               it

y of



       non-immune non-immune               00:00:                             [place]

[place]



                                   00                                 Medical



                                                                      Branch

 

       Rh     Rh     Disease Active                              [facility]



       negative negative               05                               ity of



       state in state in               00:00:                             [place]



       antepartum antepartum               00                                 Me

dical



       period period                                                  Branch

 

       Obesity Obesity Disease Active                              [facility]



       affecting affecting                                              ity 

of



       pregnancy pregnancy               00:00:                             [place]

[place]



                                                                    Medical



                                                                      Branch

 

       Nausea and Nausea and Disease Active                              U

nivers



       vomiting vomiting                                              ity of



       during during               00:00:                             [place]



       pregnancy pregnancy               00                                 Medi

marco



                                                                      Branch

 

       Biliary Biliary Disease Active                              [facility]



       obstructio obstructio               3-19                               [facility]

[facility]



       n      n                    00:00:                             Medical



                                   00                                 Center







Allergies, Adverse Reactions, Alerts







       Allergy Allergy Status Severity Reaction(s) Onset  Inactive Treating Comm

ents 

Source



       Name   Type                        Date   Date   Clinician        

 

       PENICILL DRUG   Active        Rash                         [facility]



       IN     INGREDI                                              ity of



                                          00:00:                      [place]



                                          00                          Medical



                                                                      Branch

 

       Penicill Propensi Active        Rash                         Univer

s



       in     ty to                                               ity of



              adverse                      00:00:                      [place]



              reaction                      00                          Medical



              s                                                       Branch

 

       PENICILL Allergy Active Low    Swelling                       [facility]

t



       INS                                [facility]                        [facility]



                                          00:00:                      Medical



                                          00                          Center

 

       Penicill Propensi Active        Swelling,                       [facility]



       ins    ty to                Rash   3-19                        [facility]



              adverse                      00:00:                      Medical



              reaction                      00                          Center



              s                                                       

 

       NO KNOWN Drug   Active                                           [facility]



       ALLERGIE Class                                                   ity of



       S                                                              [facility]

 

       NO KNOWN Allergy Active                                           [facility]



       ALLERGIE                                                         



       S                                                              







Social History







           Social Habit Start Date Stop Date  Quantity   Comments   Source

 

           ASSERTION  2023            Pregnant              [facility]



                      00:00:00                                    [facility]

 

           Exposure to 2023 Not sure              [facility]



           SARS-CoV-2 00:00:00   13:00:00                         [facility]



           (event)                                                [facility]

 

           Tobacco use and 2023 Smokeless tobacco            Un

iversity of



           exposure   00:00:00   00:00:00   non-user              [facility]

 

           Alcohol intake 2023 Lifetime              University

 of



                      00:00:00   00:00:00   non-drinker            [facility]



                                            (finding)             [facility]

 

           Alcohol Comment 2021 very rarely (once            [facility]

[facility]



                      00:00:00   00:00:00   or twice a year)            Medical 

Center

 

           Sex Assigned At 2000                       [facility]

[facility]



           Birth      00:00:00   00:00:00                         [facility]









                Smoking Status  Start Date      Stop Date       Source

 

                Tobacco smoking consumption                                 [facility] [place] [facility]



                unknown                                         Branch

 

                Never smoked tobacco                                 [facility]







Medications







       Ordered Filled Start  Stop   Current Ordering Indication Dosage Frequency

 Signature

                    Comments            Components          Source



     Medication Medication Date Date Medication? Clinician                (SIG) 

          



     Name Name                                                   

 

     proMETHazin            Yes       [id number] 25mg      Take 1           

Univers



     e 25 mg      4-25                               tablet by           ity of



     tablet      00:00:                               mouth           [place]



               00                                 every 4           Medical



                                                  (four)           Branch



                                                  hours as           



                                                  needed for           



                                                  Nausea and           



                                                  Vomiting           



                                                  (N/V).           

 

     prenatal            Yes       [id number]           Take by           Un

amber



     vit       4-04                               mouth.           ity of



     no.124/iron      14:03:                                              [place]



     /folic      55                                                Medical



     (PRENATAL                                                        Branch



     VITAMIN                                                        



     ORAL)                                                        

 

     prenatal            Yes       [id number]           Take by           Un

amber



     vit       4-04                               mouth.           ity of



     no.124/iron      14:03:                                              [place]



     /folic      55                                                Medical



     (PRENATAL                                                        Branch



     VITAMIN                                                        



     ORAL)                                                        







Vital Signs







             Vital Name   Observation Time Observation Value Comments     Source

 

             HEIGHT       2021 19:28:00 167.6 cm                  

 

             WEIGHT       2021 19:28:00 82.555 kg                 

 

             Systolic blood 2023 18:54:00 107 mm[Hg]                Univer

sity [facility]

 

             Diastolic blood 2023 18:54:00 66 mm[Hg]                 [facility]

 

             Heart rate   2023 18:54:00 76 /min                   [facility]

 

             Body temperature 2023 18:54:00 36.22 Cecile                 [facility]

 

             Respiratory rate 2023 18:54:00 17 /min                   [facility]

 

             Body height  2023 18:54:00 167.6 cm                  [facility]

 

             Body weight  2023 18:54:00 92.625 kg                 [facility]

 

             BMI          2023 18:54:00 32.96 kg/m2               [facility]

 

             HEIGHT       2021 19:28:00 167.6 cm                  

 

             WEIGHT       2021 19:28:00 82.555 kg                 







Procedures







                Procedure       Date / Time Performed Performing Clinician Sour

e

 

                NOTICE OF PRIVACY 2023 18:22:26 Doctor Unassigned, No Univ

[facility]



                PRACTICES                       Name            [facility]

 

                POCT PREGNANCY TEST 2023 00:00:00 Mattie Fournier [facility]

 

                POCT URINALYSIS W/O 2023 00:00:00 Mattie Fournier [facility]



                SPECIFIC [facility]







Plan of Care







             Planned Activity Planned Date Details      Comments     Source

 

             Future Scheduled 2023   INFLUENZA VACCINE              [facility]



             Test         00:00:00     (Season Ended) [code              Medical

 Center



                                       = INFLUENZA VACCINE              



                                       (Season Ended)]              

 

             Future Scheduled 2023   DEPRESSION SCREENING              [facility]



             Test         00:00:00     (12+) [code =              Medical Center



                                       DEPRESSION SCREENING              



                                       (12+)]                    

 

             Future Scheduled 2022   Tobacco Cessation              [facility]



             Test         00:00:00     Counseling and              Medical Cente

r



                                       Screening (12+) [code              



                                       = Tobacco Cessation              



                                       Counseling and              



                                       Screening (12+)]              

 

             Future Scheduled 2019   Screening for              [facility]

es



             Test         00:00:00     malignant neoplasm of              Medica

l Center



                                       cervix (procedure)              



                                       [code = 873720957]              

 

             Future Scheduled 2017   DTAP/TDAP/TD VACCINES              [facility]



             Test         00:00:00     (1 - Tdap) [code =              Medical C

enter



                                       DTAP/TDAP/TD VACCINES              



                                       (1 - Tdap)]               

 

             Future Scheduled 2016   HEPATITIS C SCREENING              [facility]



             Test         00:00:00     [code = HEPATITIS C              Medical 

Center



                                       SCREENING]                

 

             Future Scheduled 1998   COVID-19 VACCINE (#1)              [facility]



             Test         00:00:00     [code = COVID-19              Medical Twyla

ter



                                       VACCINE (#1)]              







Encounters







        Start   End     Encounter Admission Attending Care    Care    Encounter 

Source



        Date/Time Date/Time Type    Type    Clinicians Facility Department ID   

   

 

        2021-10-09         Inpatient ER      BYNUM,  [facility]    Gastro  [id number] 

[facility]



        08:58:02                         SKINNY                           

 

        2023 Outpatient R       RONNIE [facility]    [facility]    [id number]

[id number] Univers



        14:45:00 14:45:00                 MATTIE medina [facility]

 

        2023 Telephone         KIT Fournier    [id number]

[id number] [facility]



        00:00:00 00:00:00                 Mattie [facility]  [id number]         i

ty of



                                                REGIONAL [id number]         Nilesh

as



                                                MATERNAL [phone]         Med

ical



                                                & CHILD [address] [place] [address]                 

 

        2023 Outpatient R       RONNIE [facility]    [id number]

[id number] Univers



        13:45:00 14:36:15                 MATTIE                          ity of



                                                                        [facility]

 

        2023 Initial         Arshkem [facility]    [id number]

[id number] Univers



        13:45:00 14:36:15 Prenatal         Mattie A [facility]  350.1.13.10         

ity of



                        Visit                   REGIONAL [id number]         Nilesh

as



                                                MATERNAL [phone]         Med

ical



                                                & CHILD [address] [place] [address]                 

 

        2023 Orders          Doctor  CARLA    [id number]

[id number] Univers



        00:00:00 00:00:00 Only            Unassigned, PREETI   350.1.13.10       

  ity of



                                        [facility] [id number]         Nilesh

as



                                                        [id number]         [address]







Results







           Test Description Test Time  Test Comments Results    Result Comments 

Source









                    POCT PREGNANCY TEST 2023 19:02:00 









                      Test Item  Value      Reference Range Interpretation Comme

nts









             POCT PREG (test code = 1605) Positive                              

 

 

             On board controls acceptable with C Line (test code = 3574) Yes    

                                

 

             POCT PREG LOT # (test code = 3575)                                 

       

 

             POCT PREG TEST  DATE (test code = 3576)                      

                  



[facility] [id number]
19:02:00





             Test Item    Value        Reference Range Interpretation Comments

 

             POCT PH U (test code = 3254) 7 mg/dl      5-8                      

 

 

             POCT U LEUK EST (test code = negative     Negative - Negative      

        



             3263)                                               

 

             POCT U NIT (test code = 3262) negative     Negative - Negative     

         

 

             POCT U PROT (test code = 3259) trace        Negative - Negative    

          

 

             POCT U GLU (test code = 3256) negative     Negative - Negative     

         

 

             POCT U KETONE (test code = 3258) 1+           Negative - Negative  

            

 

             POCT U BLD (test code = 3257) trace        Negative - Negative     

         



[facility] [id number] 09:31:00Surgical 
Pathology Report Case: [id number] Authorizing Provider: Hiral Aguirre MD 
Collected: 2021 10:34 AM Ordering Location: [facility] PERIOPERATIVE Received: 
2021 02:51 PM  SERVICES Pathologist: Madhav Boston MD Specimen: 
Gallbladder A. GALLBLADDER, CHOLECYSTECTOMY: - CHRONIC CHOLECYSTITIS WITH 
CHOLELITHIASIS - NEGATIVE FOR DYSPLASIA OR MALIGNANCY  Signing Pathologist 
Direct PhoneLine: [phone] signed by Madhav Boston MD 
on 3/24/2021 at 9:31 [id number] diagnosis: Gallstone, pancreatitis Gallbladder
Received fresh labeled with the patient's name, accession number and 
"gallbladder" is a 8.6 cm in length x 2.3 cm in diameter intact gallbladder with
a 0.2 cm in length x 0.6 cm in diameter attached cystic duct. The serosa is tan-
pink and smooth. No lymph node is identified. Upon opening, there is a 5 cc of 
green-orange viscous bile. A single green irregular calculus is identified 
measuring 0.5 x 0.4 x 0.4 cm. Calculus is not lodged in the cystic duct.The 
calculus is crushable with a white center. No discrete masses are identified. 
Representative sections are submitted. Section code: A1 - cystic duct, en faceA2
- gallbladder wall, representative. JG/pl Performed.FL, [id number] 08:24:00
INTRA OP IMAGING Reason for exam:-&gt;choledocholithiasis
************************************************************[facility] HECTORBERT [facility]RELLANA : 2000 Sex: 
F************************************************************Fluoroscopic unit 
utilized for a procedure performed in the OR. No interpretation was requested. 
Refer to the operative report for findings. Refer to PACS for patient radiation 
dose information.BASIC METABOLIC [id number] 04:44:00





             Test Item    Value        Reference Range Interpretation Comments

 

             SODIUM (BEAKER) 139 meq/L    136-145                   



             (test code = 381)                                        

 

             POTASSIUM (BEAKER) 3.6 meq/L    3.5-5.1                   



             (test code = 379)                                        

 

             CHLORIDE (BEAKER) 105 meq/L                        



             (test code = 382)                                        

 

             CO2 (BEAKER) (test 25 meq/L     22-29                     



             code = 355)                                         

 

             BLOOD UREA NITROGEN 7 mg/dL      7-21                      



             (BEAKER) (test code                                        



             = 354)                                              

 

             CREATININE (BEAKER) 0.62 mg/dL   0.57-1.25                 



             (test code = 358)                                        

 

             GLUCOSE RANDOM 95 mg/dL                         



             (BEAKER) (test code                                        



             = 652)                                              

 

             CALCIUM (BEAKER) 8.7 mg/dL    8.4-10.2                  



             (test code = 697)                                        

 

             EGFR (BEAKER) (test 120 mL/min/1.73                           ESTIM

ATED GFR IS



             code = 1092) sq m                                   NOT AS ACCURATE

 AS



                                                                 CREATININE



                                                                 CLEARANCE IN



                                                                 PREDICTING



                                                                 GLOMERULAR



                                                                 FILTRATION RATE

.



                                                                 ESTIMATED GFR I

S



                                                                 NOT APPLICABLE 

FOR



                                                                 DIALYSIS PATIEN

TS.



 ID - MARY ALICE MHEPATIC FUNCTION [id number] 04:44:00





             Test Item    Value        Reference Range Interpretation Comments

 

             TOTAL PROTEIN (BEAKER) (test code = 5.8 gm/dL    6.0-8.3      L    

        



             770)                                                

 

             ALBUMIN (BEAKER) (test code = 1145) 3.3 g/dL     3.5-5.0      L    

        

 

             BILIRUBIN TOTAL (BEAKER) (test code 0.6 mg/dL    0.2-1.2           

        



             = 377)                                              

 

             BILIRUBIN DIRECT (BEAKER) (test 0.4 mg/dL    0.1-0.5               

    



             code = 706)                                         

 

             ALKALINE PHOSPHATASE (BEAKER) (test 113 U/L                  

        



             code = 346)                                         

 

             AST (SGOT) (BEAKER) (test code = 21 U/L       5-34                 

     



             353)                                                

 

             ALT (SGPT) (BEAKER) (test code = 162 U/L      6-55         H       

     



             347)                                                



 ID - MARY ALICE [facility] W/PLT COUNT &amp; AUTO [id number] 04:23:00





             Test Item    Value        Reference Range Interpretation Comments

 

             WHITE BLOOD CELL COUNT (BEAKER) 7.2 K/ L     3.5-10.5              

    



             (test code = 775)                                        

 

             RED BLOOD CELL COUNT (BEAKER) 3.61 M/ L    3.93-5.22    L          

  



             (test code = 761)                                        

 

             HEMOGLOBIN (BEAKER) (test code = 9.9 GM/DL    11.2-15.7    L       

     



             410)                                                

 

             HEMATOCRIT (BEAKER) (test code = 30.6 %       34.1-44.9    L       

     



             411)                                                

 

             MEAN CORPUSCULAR VOLUME (BEAKER) 84.8 fL      79.4-94.8            

     



             (test code = 753)                                        

 

             MEAN CORPUSCULAR HEMOGLOBIN 27.4 pg      25.6-32.2                 



             (BEAKER) (test code = 751)                                        

 

             MEAN CORPUSCULAR HEMOGLOBIN CONC 32.4 GM/DL   32.2-35.5            

     



             (BEAKER) (test code = 752)                                        

 

             RED CELL DISTRIBUTION WIDTH 12.9 %       11.7-14.4                 



             (BEAKER) (test code = 412)                                        

 

             PLATELET COUNT (BEAKER) (test 235 K/CU MM  150-450                 

  



             code = 756)                                         

 

             MEAN PLATELET VOLUME (BEAKER) 10.2 fL      9.4-12.3                

  



             (test code = 754)                                        

 

             NUCLEATED RED BLOOD CELLS 0 /100 WBC   0-0                       



             (BEAKER) (test code = 413)                                        

 

             NEUTROPHILS RELATIVE PERCENT 59 %                                  

 



             (BEAKER) (test code = 429)                                        

 

             LYMPHOCYTES RELATIVE PERCENT 29 %                                  

 



             (BEAKER) (test code = 430)                                        

 

             MONOCYTES RELATIVE PERCENT 10 %                                   



             (BEAKER) (test code = 431)                                        

 

             EOSINOPHILS RELATIVE PERCENT 1 %                                   

 



             (BEAKER) (test code = 432)                                        

 

             BASOPHILS RELATIVE PERCENT 0 %                                    



             (BEAKER) (test code = 437)                                        

 

             NEUTROPHILS ABSOLUTE COUNT 4.23 K/ L    1.56-6.13                 



             (BEAKER) (test code = 670)                                        

 

             LYMPHOCYTES ABSOLUTE COUNT 2.10 K/ L    1.18-3.74                 



             (BEAKER) (test code = 414)                                        

 

             MONOCYTES ABSOLUTE COUNT (BEAKER) 0.75 K/ L    0.24-0.36    H      

      



             (test code = 415)                                        

 

             EOSINOPHILS ABSOLUTE COUNT 0.06 K/ L    0.04-0.36                 



             (BEAKER) (test code = 416)                                        

 

             BASOPHILS ABSOLUTE COUNT (BEAKER) 0.02 K/ L    0.01-0.08           

      



             (test code = 417)                                        

 

             IMMATURE GRANULOCYTES-RELATIVE 0 %          0-1                    

   



             PERCENT (BEAKER) (test code =                                      

  



             2801)                                               



FL, FLUORO, NON-SPECIFIC, UP TO 1 [id number] 12:54:00Reason for 
exam:-&gt;cholecystectomy
********************************************************[facility] : 2000 Sex: 
F************************************************************Fluoroscopic unit 
utilized for a procedure performed in the OR. No interpretation was requested. 
Refer to the operative report for findings. Refer to PACS for patient radiation 
dose information.SARS-COV2/RT-PCR ([facility])2021 09:53:00





             Test Item    Value        Reference Range Interpretation Comments

 

             SARS-COV2/RT-PCR (test Negative     Not Detected, Negative,        

      



             code = 2991276)              See external report for              



                                       linked test               

 

             SARS-COV-2 PERFORMING LAB [facility]                             



             (test code = 1577871)                                        



Negative result for this test determines that SARS-CoV-2 RNA was not present in 
the specimen above the Limit of Detection (LOD). However, Negative results do 
not preclude SARS-CoV-2 infection and should not be used as the sole basis for 
treatment or patient management decisions. Negative results must be combined 
with clinical observations, patient history, and epidemiological information. A 
false negative result may occur if a specimen is improperly collected, 
transported or handled. A false negative result should be considered if 
patient's recent exposures or clinical presentation indicate that COVID-19 
(SARS-CoV-2) is likely and diagnostic tests for other causes of illness are 
negative. Re-testing should be considered in cases of suspected false 
negatives.The limit of detection for this assay is 800 copies/mL.This SARS CoV-2
test is a real-time RT-PCR test intended for the qualitative detection of 
nucleic acid from SARS-CoV-2 in a nasopharyngeal swab specimen collected from 
individuals suspected of COVID-19 by their healthcare provider.This test has not
been Food and Drug Administration (FDA) cleared or approved. This is a modified 
version of an approved Emergency Use Authorization (EUA) and is in the process 
of review by the FDA. Once authorized by the FDA, the issued EUA will be 
effective until the declaration that circumstances exist justifying the 
authorization of the emergency use ofin vitro diagnostic tests for detection 
and/or diagnosis of COVID-19 is terminated under Section 564(b)(2) of the Act or
the EUA is revoked under Section 564(g) of the Act.Fact Sheet for Healthcare 
Prov
iders:https://www.[employer]/sites/default/files/product/documents/Fact_Sheet_HC

_Jqtvyqdkd_Ukqw_QQKT-TpT-0.pdfFact Sheet for Healthcare 
Patients:https://www.[employer]/sites/default/files/product/docume
nts/Kbvs_Cgdbl_Qwazdfpb_Diti_GZJR-TbM-7.pdfPerforming Laboratory:[facility], [place] [address]YPVRQAGNA, ADULT TOTAL
2021 04:15:00





             Test Item    Value        Reference Range Interpretation Comments

 

             BILIRUBIN TOTAL (BEAKER) (test code 1.0 mg/dL    0.2-1.2           

        



             = 377)                                              



 ID - EDASIBILIRUBIN, [id number] 04:15:00





             Test Item    Value        Reference Range Interpretation Comments

 

             BILIRUBIN DIRECT (BEAKER) (test 0.5 mg/dL    0.1-0.5               

    



             code = 706)                                         



 ID - EDASISpecimen slightly lipemicPREGNANCY SCREEN, [id number] 
12:48:00





             Test Item    Value        Reference Range Interpretation Comments

 

             PREGNANCY TEST URINE (BEAKER) (test Negative                       

        



             code = 583)                                         



BILIRUBIN, [id number] 11:26:00





             Test Item    Value        Reference Range Interpretation Comments

 

             BILIRUBIN DIRECT (BEAKER) (test 0.6 mg/dL    0.1-0.5      H        

    



             code = 706)                                         



 ID - DBCOMPREHENSIVE METABOLIC [id number] 06:06:00





             Test Item    Value        Reference Range Interpretation Comments

 

             TOTAL PROTEIN 6.1 gm/dL    6.0-8.3                   



             (BEAKER) (test code =                                        



             770)                                                

 

             ALBUMIN (BEAKER) 3.6 g/dL     3.5-5.0                   



             (test code = 1145)                                        

 

             ALKALINE PHOSPHATASE 162 U/L             H            



             (BEAKER) (test code =                                        



             346)                                                

 

             BILIRUBIN TOTAL 1.1 mg/dL    0.2-1.2                   



             (BEAKER) (test code =                                        



             377)                                                

 

             SODIUM (BEAKER) (test 137 meq/L    136-145                   



             code = 381)                                         

 

             POTASSIUM (BEAKER) 3.8 meq/L    3.5-5.1                   



             (test code = 379)                                        

 

             CHLORIDE (BEAKER) 106 meq/L                        



             (test code = 382)                                        

 

             CO2 (BEAKER) (test 21 meq/L     22-29        L            



             code = 355)                                         

 

             BLOOD UREA NITROGEN 5 mg/dL      7-21         L            



             (BEAKER) (test code =                                        



             354)                                                

 

             CREATININE (BEAKER) 0.59 mg/dL   0.57-1.25                 



             (test code = 358)                                        

 

             GLUCOSE RANDOM 88 mg/dL                         



             (BEAKER) (test code =                                        



             652)                                                

 

             CALCIUM (BEAKER) 8.4 mg/dL    8.4-10.2                  



             (test code = 697)                                        

 

             AST (SGOT) (BEAKER) 52 U/L       5-34         H            



             (test code = 353)                                        

 

             ALT (SGPT) (BEAKER) 365 U/L      6-55         H            



             (test code = 347)                                        

 

             EGFR (BEAKER) (test 127                                    ESTIMATE

D GFR IS



             code = 1092) mL/min/1.73 sq                           NOT AS ACCURA

TE AS



                          m                                      CREATININE



                                                                 CLEARANCE IN



                                                                 PREDICTING



                                                                 GLOMERULAR



                                                                 FILTRATION RATE

.



                                                                 ESTIMATED GFR I

S



                                                                 NOT APPLICABLE 

FOR



                                                                 DIALYSIS PATIEN

TS.



 ID - EDASIMR, ABDOMEN, [id number] 06:24:00Unlisted Reason for Exam 
- Click Yes and Enter Reason Below-&gt;No
************************************************************[facility] : 2000 Sex: 
F************************************************************FINAL REPORT 
PATIENT ID: [id number] TECHNIQUE: MRI of the abdomen and MRCP WITHOUT intravenous 
contrast. 3-D volume reconstructions were obtained to evaluate the biliary 
ductal system. INDICATION: Biliary obstruction suspected. COMPARISON: None. 
FINDINGS: ABSENCE OF INTRAVENOUS CONTRAST DECREASES SENSITIVITY FOR DETECTION OF
FOCAL LESIONS AND VASCULAR PATHOLOGY. LOWER THORAX: Unremarkable. LIVER: No 
hepatic signal abnormality. No focal hepatic lesions. BILIARY: There is a 
structure which is hypointense onT2-weighted imaging in the dependent portion of
the gallbladder which measures 0.3 cm and is most likely a stone No biliary 
ductal dilatation or filling defect. Low medial insertion of the cystic duct.
There is some prominent T2 intermediate signal at the ampulla.SPLEEN: No 
splenomegaly. The spleen measures 12.8 cm in length. PANCREAS: No focal masses 
or ductal dilatation. Diffusely increased interstitial T2 weighted signal of the
pancreas. Moderate amount of peripancreatic edema. ADRENALS: No adrenal 
nodules.KIDNEYS/URETERS: No hydronephrosis or solid mass lesions. 
PERITONEUM/RETROPERITONEUM: Small-volume upper abdominal ascites.LYMPH NODES: No
lymphadenopathy.VESSELS: Unremarkable. GI TRACT: No distention or wall 
thickening. BONES AND SOFT TISSUES: Unremarkable. IMPRESSION: 1.The common bile 
duct is normal in diameter. No definite choledocholithiasis is seen. The 
prominent T2 weighted signal at the ampulla is most likely edema. However, this 
area is suboptimally evaluated on MRCP. While no choledocholithiasis is 
suspected, if there is significant clinical suspicion, ERCP could be considered.
2.Cholelithiasis without acute cholecystitis. 3.Low medial insertion of the 
cystic duct. 4.The findings are consistent with acute pancreatitis. While 
evaluation for necrosis is suboptimal without the use of intravenous contrast, 
this is most likely interstitial edematous pancreatitis. Signed: Dayton Mcgarry 
[facility] Verified Date/Time: 2021 06:24:34 Electronically signed by: DAYTON MCGARRY MD on 2021 06:24 [id number] 21:00:00





             Test Item    Value        Reference Range Interpretation Comments

 

             LIPASE (BEAKER) (test code = 749) 1200 U/L     8-78         H      

      



BASIC METABOLIC [id number] 20:32:00





             Test Item    Value        Reference Range Interpretation Comments

 

             SODIUM (BEAKER) 139 meq/L    136-145                   



             (test code = 381)                                        

 

             POTASSIUM (BEAKER) 4.2 meq/L    3.5-5.1                   



             (test code = 379)                                        

 

             CHLORIDE (BEAKER) 106 meq/L                        



             (test code = 382)                                        

 

             CO2 (BEAKER) (test 18 meq/L     22-29        L            



             code = 355)                                         

 

             BLOOD UREA NITROGEN 11 mg/dL     7-21                      



             (BEAKER) (test code                                        



             = 354)                                              

 

             CREATININE (BEAKER) 0.66 mg/dL   0.57-1.25                 



             (test code = 358)                                        

 

             GLUCOSE RANDOM 101 mg/dL                        



             (BEAKER) (test code                                        



             = 652)                                              

 

             CALCIUM (BEAKER) 9.1 mg/dL    8.4-10.2                  



             (test code = 697)                                        

 

             EGFR (BEAKER) (test 112 mL/min/1.73                           ESTIM

ATED GFR IS



             code = 1092) sq m                                   NOT AS ACCURATE

 AS



                                                                 CREATININE



                                                                 CLEARANCE IN



                                                                 PREDICTING



                                                                 GLOMERULAR



                                                                 FILTRATION RATE

.



                                                                 ESTIMATED GFR I

S



                                                                 NOT APPLICABLE 

FOR



                                                                 DIALYSIS PATIEN

TS.



 ID - [id number] 20:32:00





             Test Item    Value        Reference Range Interpretation Comments

 

             TRIGLYCERIDES (BEAKER) (test code = 51 mg/dL                       

        



             540)                                                



TRIGLYCERIDE REFERENCE RANGELow Risk &lt;150Borderline Risk 150-199High Risk 
200-499Very High Risk &gt;=500Operator ID - DBHEPATIC FUNCTION [id number] 
20:32:00





             Test Item    Value        Reference Range Interpretation Comments

 

             TOTAL PROTEIN (BEAKER) (test code = 7.8 gm/dL    6.0-8.3           

        



             770)                                                

 

             ALBUMIN (BEAKER) (test code = 1145) 4.5 g/dL     3.5-5.0           

        

 

             BILIRUBIN TOTAL (BEAKER) (test code 2.3 mg/dL    0.2-1.2      H    

        



             = 377)                                              

 

             BILIRUBIN DIRECT (BEAKER) (test 1.7 mg/dL    0.1-0.5      H        

    



             code = 706)                                         

 

             ALKALINE PHOSPHATASE (BEAKER) (test 257 U/L             H    

        



             code = 346)                                         

 

             AST (SGOT) (BEAKER) (test code = 247 U/L      5-34         H       

     



             353)                                                

 

             ALT (SGPT) (BEAKER) (test code = 781 U/L      6-55         H       

     



             347)                                                



 ID - DBCBC W/PLT COUNT &amp; AUTO [id number] 20:17:00





             Test Item    Value        Reference Range Interpretation Comments

 

             WHITE BLOOD CELL COUNT (BEAKER) 10.5 K/ L    3.5-10.5              

    



             (test code = 775)                                        

 

             RED BLOOD CELL COUNT (BEAKER) 4.89 M/ L    3.93-5.22               

  



             (test code = 761)                                        

 

             HEMOGLOBIN (BEAKER) (test code = 13.3 GM/DL   11.2-15.7            

     



             410)                                                

 

             HEMATOCRIT (BEAKER) (test code = 42.0 %       34.1-44.9            

     



             411)                                                

 

             MEAN CORPUSCULAR VOLUME (BEAKER) 85.9 fL      79.4-94.8            

     



             (test code = 753)                                        

 

             MEAN CORPUSCULAR HEMOGLOBIN 27.2 pg      25.6-32.2                 



             (BEAKER) (test code = 751)                                        

 

             MEAN CORPUSCULAR HEMOGLOBIN CONC 31.7 GM/DL   32.2-35.5    L       

     



             (BEAKER) (test code = 752)                                        

 

             RED CELL DISTRIBUTION WIDTH 13.2 %       11.7-14.4                 



             (BEAKER) (test code = 412)                                        

 

             PLATELET COUNT (BEAKER) (test 295 K/CU MM  150-450                 

  



             code = 756)                                         

 

             MEAN PLATELET VOLUME (BEAKER) 10.2 fL      9.4-12.3                

  



             (test code = 754)                                        

 

             NUCLEATED RED BLOOD CELLS 0 /100 WBC   0-0                       



             (BEAKER) (test code = 413)                                        

 

             NEUTROPHILS RELATIVE PERCENT 87 %                                  

 



             (BEAKER) (test code = 429)                                        

 

             LYMPHOCYTES RELATIVE PERCENT 7 %                                   

 



             (BEAKER) (test code = 430)                                        

 

             MONOCYTES RELATIVE PERCENT 6 %                                    



             (BEAKER) (test code = 431)                                        

 

             EOSINOPHILS RELATIVE PERCENT 0 %                                   

 



             (BEAKER) (test code = 432)                                        

 

             BASOPHILS RELATIVE PERCENT 0 %                                    



             (BEAKER) (test code = 437)                                        

 

             NEUTROPHILS ABSOLUTE COUNT 9.11 K/ L    1.56-6.13    H            



             (BEAKER) (test code = 670)                                        

 

             LYMPHOCYTES ABSOLUTE COUNT 0.69 K/ L    1.18-3.74    L            



             (BEAKER) (test code = 414)                                        

 

             MONOCYTES ABSOLUTE COUNT (BEAKER) 0.63 K/ L    0.24-0.36    H      

      



             (test code = 415)                                        

 

             EOSINOPHILS ABSOLUTE COUNT 0.00 K/ L    0.04-0.36    L            



             (BEAKER) (test code = 416)                                        

 

             BASOPHILS ABSOLUTE COUNT (BEAKER) 0.01 K/ L    0.01-0.08           

      



             (test code = 417)                                        

 

             IMMATURE GRANULOCYTES-RELATIVE 0 %          0-1                    

   



             PERCENT (BEAKER) (test code =                                      

  



             2801)

## 2025-03-25 NOTE — RAD REPORT
EXAM DESCRIPTION:  US - OB Limited - 4/25/2023 3:25 pm

 

CLINICAL HISTORY:  ABD PAIN

Pain and swelling

 

COMPARISON:  <Comparisons>

 

FINDINGS:  A single live intrauterine gestation is seen. Heart is normal 153 BPM. Estimated gestation
al age 13 weeks 6 days. Normal amniotic fluid volume. No placental abruption. no